# Patient Record
Sex: FEMALE | URBAN - METROPOLITAN AREA
[De-identification: names, ages, dates, MRNs, and addresses within clinical notes are randomized per-mention and may not be internally consistent; named-entity substitution may affect disease eponyms.]

---

## 2022-04-25 ENCOUNTER — WEB ENCOUNTER (OUTPATIENT)
Dept: URBAN - METROPOLITAN AREA CLINIC 113 | Facility: CLINIC | Age: 47
End: 2022-04-25

## 2022-04-25 ENCOUNTER — CLAIMS CREATED FROM THE CLAIM WINDOW (OUTPATIENT)
Dept: URBAN - METROPOLITAN AREA CLINIC 113 | Facility: CLINIC | Age: 47
End: 2022-04-25
Payer: COMMERCIAL

## 2022-04-25 VITALS
DIASTOLIC BLOOD PRESSURE: 76 MMHG | TEMPERATURE: 97.5 F | SYSTOLIC BLOOD PRESSURE: 114 MMHG | RESPIRATION RATE: 20 BRPM | HEART RATE: 78 BPM | HEIGHT: 62 IN | BODY MASS INDEX: 26.68 KG/M2 | WEIGHT: 145 LBS

## 2022-04-25 DIAGNOSIS — R76.8 ANTIMITOCHONDRIAL ANTIBODY POSITIVE: ICD-10-CM

## 2022-04-25 PROCEDURE — 99244 OFF/OP CNSLTJ NEW/EST MOD 40: CPT | Performed by: NURSE PRACTITIONER

## 2022-04-25 PROCEDURE — 99204 OFFICE O/P NEW MOD 45 MIN: CPT | Performed by: INTERNAL MEDICINE

## 2022-04-25 PROCEDURE — 99244 OFF/OP CNSLTJ NEW/EST MOD 40: CPT | Performed by: INTERNAL MEDICINE

## 2022-04-25 RX ORDER — SALICYLIC ACID 3 G/100G
1 TABLET SWALLOW WHOLE WITH WATER; DO NOT TAKE WITH FRUIT JUICES LOTION TOPICAL ONCE A DAY
Status: ACTIVE | COMMUNITY

## 2022-04-25 RX ORDER — IXEKIZUMAB 80 MG/ML
1 ML INJECTION, SOLUTION SUBCUTANEOUS
Status: ACTIVE | COMMUNITY

## 2022-04-25 RX ORDER — MELATONIN 5 MG
1 TABLET IN THE EVENING TABLET ORAL ONCE A DAY
Status: ACTIVE | COMMUNITY

## 2022-04-25 RX ORDER — SERTRALINE 50 MG/1
1 TABLET TABLET, FILM COATED ORAL ONCE A DAY
Status: ACTIVE | COMMUNITY

## 2022-04-25 RX ORDER — AZELASTINE HYDROCHLORIDE 137 UG/1
1 PUFF IN EACH NOSTRIL SPRAY, METERED NASAL TWICE A DAY
Status: ACTIVE | COMMUNITY

## 2022-04-25 RX ORDER — BUSPIRONE HYDROCHLORIDE 10 MG/1
1 TABLET TABLET ORAL TWICE A DAY
Status: ACTIVE | COMMUNITY

## 2022-04-25 NOTE — HPI-TODAY'S VISIT:
47yo female with a history of psoriatic arthritis referred by Karen Penrose, NP for evaluation of possible PBC.   Within the last 6 months, she has experienced increasing flares of psoriatic symptoms despite [facility], prompting bloodwork with dermatology as outlined below. She is without abdominal complaint. No abdominal pain, nausea or vomiting. Her bowel habits are regular and she denies blood per rectum. No jaundice or icterus. She has not had any recent imaging of her abdomen, but states a prior MRI of her breast did incidentally note a small liver cyst. She had a normal colonoscopy in 2020 with Dr. Anne at [place] and underwent Botox of an anal fissure at that time. She was told to repeat her colonoscopy every 5 years as both parents have a history of colon polyps.  Labs  3/18/22: Reactive hepatitis A antibody total, hepatitis B surface antibody. Negative hepatitis B surface antigen, hepatitis B core antibody total, hepatitis C antibody. Positive AMA, titer greater than 1:640. 2/28/22: H/H 12.1/37, MCV 90, Plt 350, WBC 10.2. CMP unremarkable with normal LFTs, AST 24, ALT 16, ALP 56, Tbili 0.7. CRP 5.5. Normal TSH. Positive BRIE, titer 1:1280 with cytoloplasmic, reticular/AMA pattern.

## 2022-05-01 LAB
A/G RATIO: 1.4
ACTIN (SMOOTH MUSCLE) ANTIBODY: 13
ALBUMIN: 4.5
ALKALINE PHOSPHATASE: 98
ALT (SGPT): 19
AST (SGOT): 26
BASO (ABSOLUTE): 0.2
BASOS: 2
BILIRUBIN, TOTAL: 0.3
BUN/CREATININE RATIO: 16
BUN: 15
CALCIUM: 9.3
CARBON DIOXIDE, TOTAL: 21
CHLORIDE: 100
CREATININE: 0.94
EGFR: 75
EOS (ABSOLUTE): 0.6
EOS: 7
GLOBULIN, TOTAL: 3.3
GLUCOSE: 86
HEMATOCRIT: 38.7
HEMATOLOGY COMMENTS:: (no result)
HEMOGLOBIN: 12.4
IMMATURE CELLS: (no result)
IMMATURE GRANS (ABS): 0
IMMATURE GRANULOCYTES: 0
IMMUNOGLOBULIN A, QN, SERUM: 234
IMMUNOGLOBULIN E, TOTAL: <2
IMMUNOGLOBULIN G, QN, SERUM: 1218
IMMUNOGLOBULIN M, QN, SERUM: 424
LYMPHS (ABSOLUTE): 2.4
LYMPHS: 26
MCH: 29.7
MCHC: 32
MCV: 93
MITOCHONDRIAL (M2) ANTIBODY: 252.7
MONOCYTES(ABSOLUTE): 0.9
MONOCYTES: 11
NEUTROPHILS (ABSOLUTE): 4.9
NEUTROPHILS: 54
NRBC: (no result)
PLATELETS: 453
POTASSIUM: 4.9
PROTEIN, TOTAL: 7.8
RBC: 4.17
RDW: 13.1
SODIUM: 139
WBC: 8.9

## 2022-05-05 ENCOUNTER — TELEPHONE ENCOUNTER (OUTPATIENT)
Dept: URBAN - METROPOLITAN AREA CLINIC 113 | Facility: CLINIC | Age: 47
End: 2022-05-05

## 2022-05-16 ENCOUNTER — TELEPHONE ENCOUNTER (OUTPATIENT)
Dept: URBAN - METROPOLITAN AREA CLINIC 113 | Facility: CLINIC | Age: 47
End: 2022-05-16

## 2022-07-08 ENCOUNTER — OFFICE VISIT (OUTPATIENT)
Dept: URBAN - METROPOLITAN AREA CLINIC 113 | Facility: CLINIC | Age: 47
End: 2022-07-08
Payer: COMMERCIAL

## 2022-07-08 ENCOUNTER — WEB ENCOUNTER (OUTPATIENT)
Dept: URBAN - METROPOLITAN AREA CLINIC 113 | Facility: CLINIC | Age: 47
End: 2022-07-08

## 2022-07-08 VITALS
BODY MASS INDEX: 26.5 KG/M2 | RESPIRATION RATE: 16 BRPM | HEIGHT: 62 IN | HEART RATE: 71 BPM | WEIGHT: 144 LBS | TEMPERATURE: 98.6 F

## 2022-07-08 DIAGNOSIS — R76.8 ANTIMITOCHONDRIAL ANTIBODY POSITIVE: ICD-10-CM

## 2022-07-08 PROCEDURE — 99214 OFFICE O/P EST MOD 30 MIN: CPT | Performed by: INTERNAL MEDICINE

## 2022-07-08 RX ORDER — SERTRALINE 50 MG/1
1 TABLET TABLET, FILM COATED ORAL ONCE A DAY
Status: ACTIVE | COMMUNITY

## 2022-07-08 RX ORDER — BUSPIRONE HYDROCHLORIDE 10 MG/1
1 TABLET TABLET ORAL TWICE A DAY
Status: ACTIVE | COMMUNITY

## 2022-07-08 RX ORDER — AZELASTINE HYDROCHLORIDE 137 UG/1
1 PUFF IN EACH NOSTRIL SPRAY, METERED NASAL TWICE A DAY
Status: ACTIVE | COMMUNITY

## 2022-07-08 RX ORDER — IXEKIZUMAB 80 MG/ML
1 ML INJECTION, SOLUTION SUBCUTANEOUS
Status: ACTIVE | COMMUNITY

## 2022-07-08 RX ORDER — SALICYLIC ACID 3 G/100G
1 TABLET SWALLOW WHOLE WITH WATER; DO NOT TAKE WITH FRUIT JUICES LOTION TOPICAL ONCE A DAY
Status: ACTIVE | COMMUNITY

## 2022-07-08 RX ORDER — MELATONIN 5 MG
1 TABLET IN THE EVENING TABLET ORAL ONCE A DAY
Status: ACTIVE | COMMUNITY

## 2022-07-08 NOTE — HPI-OTHER HISTORIES
Labs  3/18/22: Reactive hepatitis A antibody total, hepatitis B surface antibody. Negative hepatitis B surface antigen, hepatitis B core antibody total, hepatitis C antibody. Positive AMA, titer greater than 1:640. 2/28/22: H/H 12.1/37, MCV 90, Plt 350, WBC 10.2. CMP unremarkable with normal LFTs, AST 24, ALT 16, ALP 56, Tbili 0.7. CRP 5.5. Normal TSH. Positive BRIE, titer 1:1280 with cytoloplasmic, reticular/AMA pattern. She had a normal colonoscopy in 2020 with Dr. Anne at [place] and underwent Botox of an anal fissure at that time. She was told to repeat her colonoscopy every 5 years as both parents have a history of colon polyps.

## 2022-07-08 NOTE — HPI-TODAY'S VISIT:
47yo female with a history of psoriatic arthritis presenting for follow-up of a positive AMA. She was last seen in the office 4/25/2022 for evaluation of a positive antimitochondrial antibody, incidentally noted during routine labs performed for follow-up of her psoriatic arthritis.  Additional labs were planned to exclude primary biliary cholangitis, and an abdominal ultrasound was recommended to evaluate hepatic parenchyma. Abdominal ultrasound 4/29/2022 was normal. Labs 4/25/2022:Markedly elevated AMA at 252.7, negative anti-smooth muscle antibody.  Elevated serum immunoglobulin M, normal IgG.  H/H12.4/38.7, MCV 93, , WBC 8.9.  CMP unremarkable with normal LFTs, AST 26, ALT 19, ALP 98, T bili 0.3. She continues to do well from a GI standpoint. No abdominal pain, nausea or vomiting. Her bowel habits are regular and she denies blood per rectum. She has pending labs with her dermatologist, to include a CMP. She may be transitioning to Cimzia for treatment of psoriatic arthritis in the near future.

## 2022-08-23 ENCOUNTER — TELEPHONE ENCOUNTER (OUTPATIENT)
Dept: URBAN - METROPOLITAN AREA CLINIC 113 | Facility: CLINIC | Age: 47
End: 2022-08-23

## 2022-11-11 ENCOUNTER — CLAIMS CREATED FROM THE CLAIM WINDOW (OUTPATIENT)
Dept: URBAN - METROPOLITAN AREA CLINIC 113 | Facility: CLINIC | Age: 47
End: 2022-11-11
Payer: COMMERCIAL

## 2022-11-11 VITALS
BODY MASS INDEX: 25.58 KG/M2 | HEART RATE: 89 BPM | HEIGHT: 62 IN | WEIGHT: 139 LBS | SYSTOLIC BLOOD PRESSURE: 113 MMHG | RESPIRATION RATE: 16 BRPM | DIASTOLIC BLOOD PRESSURE: 71 MMHG | TEMPERATURE: 96.1 F

## 2022-11-11 DIAGNOSIS — R76.8 ANTIMITOCHONDRIAL ANTIBODY POSITIVE: ICD-10-CM

## 2022-11-11 PROCEDURE — 99213 OFFICE O/P EST LOW 20 MIN: CPT | Performed by: INTERNAL MEDICINE

## 2022-11-11 RX ORDER — SALICYLIC ACID 3 G/100G
1 TABLET SWALLOW WHOLE WITH WATER; DO NOT TAKE WITH FRUIT JUICES LOTION TOPICAL ONCE A DAY
Status: ACTIVE | COMMUNITY

## 2022-11-11 RX ORDER — IXEKIZUMAB 80 MG/ML
1 ML INJECTION, SOLUTION SUBCUTANEOUS
Status: ON HOLD | COMMUNITY

## 2022-11-11 RX ORDER — AZELASTINE HYDROCHLORIDE 137 UG/1
1 PUFF IN EACH NOSTRIL SPRAY, METERED NASAL TWICE A DAY
Status: ACTIVE | COMMUNITY

## 2022-11-11 RX ORDER — CERTOLIZUMAB PEGOL 400 MG
AS DIRECTED KIT SUBCUTANEOUS
Status: ACTIVE | COMMUNITY

## 2022-11-11 RX ORDER — SERTRALINE 50 MG/1
1 TABLET TABLET, FILM COATED ORAL ONCE A DAY
Status: ACTIVE | COMMUNITY

## 2022-11-11 RX ORDER — MELATONIN 5 MG
1 TABLET IN THE EVENING TABLET ORAL ONCE A DAY
Status: ACTIVE | COMMUNITY

## 2022-11-11 RX ORDER — BUSPIRONE HYDROCHLORIDE 10 MG/1
1 TABLET TABLET ORAL TWICE A DAY
Status: ACTIVE | COMMUNITY

## 2022-11-11 NOTE — HPI-OTHER HISTORIES
Labs  3/18/22: Reactive hepatitis A antibody total, hepatitis B surface antibody. Negative hepatitis B surface antigen, hepatitis B core antibody total, hepatitis C antibody. Positive AMA, titer greater than 1:640. 2/28/22: H/H 12.1/37, MCV 90, Plt 350, WBC 10.2. CMP unremarkable with normal LFTs, AST 24, ALT 16, ALP 56, Tbili 0.7. CRP 5.5. Normal TSH. Positive BRIE, titer 1:1280 with cytoloplasmic, reticular/AMA pattern. She had a normal colonoscopy in 2020 with Dr. Anne at [place] and underwent Botox of an anal fissure at that time. She was told to repeat her colonoscopy every 5 years as both parents have a history of colon polyps.

## 2022-11-11 NOTE — HPI-TODAY'S VISIT:
46 yo female with a history of psoriatic arthritis presenting for follow-up of a positive AMA. She was seen in the office 4/25/2022 for evaluation of a positive antimitochondrial antibody, incidentally noted during routine labs performed for follow-up of her psoriatic arthritis.  Abdominal ultrasound 4/29/2022 was normal.  Labs 4/25/2022:Markedly elevated AMA at 252.7, negative anti-smooth muscle antibody.  Elevated serum immunoglobulin M, normal IgG.  H/H12.4/38.7, MCV 93, , WBC 8.9.  CMP unremarkable with normal LFTs, AST 26, ALT 19, ALP 98, T bili 0.3.  She is doing well and has had no heartburn, dysphagia, abdominal pain, fevers or chills, nausea or vomiting.  No skin rashes.  She does have a bowel movement everyday this been no blood or melena.  She does not smoke cigarettes.  She is on Cimzia for treatment of psoriatic arthritis. She did test positive for C. difficile and was treated with Flagyl about a week after her last visit.  Currently no diarrhea.  Blood work on 8/3/2022 revealed a hemoglobin of 12.5 WBC of 7.6 and platelet count 461,000.  Sodium 133 potassium 3.9 BUN 14 creatinine 1.02.  AST 21, ALT 14, alk phosphatase 50, total bili 0.9.  Hepatitis A total antibody positive hepatitis B surface antibody positive, albumin was 4.5.

## 2022-11-16 ENCOUNTER — TELEPHONE ENCOUNTER (OUTPATIENT)
Dept: URBAN - METROPOLITAN AREA CLINIC 113 | Facility: CLINIC | Age: 47
End: 2022-11-16

## 2022-11-16 LAB
CHOL/HDLC RATIO: 2.3
CHOLESTEROL, TOTAL: 202
GGT: 13
HDL CHOLESTEROL: 88
INR: 0.9
LDL CHOLESTEROL CALC: 93
NON HDL CHOLESTEROL: 114
PT: 9.4
TRIGLYCERIDES: 115
VITAMIN A: 59
VITAMIN D,25-OH,TOTAL,IA: 37

## 2022-12-07 ENCOUNTER — TELEPHONE ENCOUNTER (OUTPATIENT)
Dept: URBAN - METROPOLITAN AREA CLINIC 113 | Facility: CLINIC | Age: 47
End: 2022-12-07

## 2022-12-09 ENCOUNTER — TELEPHONE ENCOUNTER (OUTPATIENT)
Dept: URBAN - METROPOLITAN AREA CLINIC 113 | Facility: CLINIC | Age: 47
End: 2022-12-09

## 2022-12-12 PROBLEM — 31712002: Status: ACTIVE | Noted: 2022-12-12

## 2023-03-08 ENCOUNTER — TELEPHONE ENCOUNTER (OUTPATIENT)
Dept: URBAN - METROPOLITAN AREA CLINIC 113 | Facility: CLINIC | Age: 48
End: 2023-03-08

## 2023-09-20 ENCOUNTER — TELEPHONE ENCOUNTER (OUTPATIENT)
Dept: URBAN - METROPOLITAN AREA CLINIC 113 | Facility: CLINIC | Age: 48
End: 2023-09-20

## 2023-11-10 ENCOUNTER — OFFICE VISIT (OUTPATIENT)
Dept: URBAN - METROPOLITAN AREA CLINIC 113 | Facility: CLINIC | Age: 48
End: 2023-11-10

## 2024-01-12 ENCOUNTER — OFFICE VISIT (OUTPATIENT)
Dept: URBAN - METROPOLITAN AREA CLINIC 113 | Facility: CLINIC | Age: 49
End: 2024-01-12
Payer: COMMERCIAL

## 2024-01-12 ENCOUNTER — DASHBOARD ENCOUNTERS (OUTPATIENT)
Age: 49
End: 2024-01-12

## 2024-01-12 VITALS
RESPIRATION RATE: 18 BRPM | HEIGHT: 62 IN | TEMPERATURE: 97.3 F | BODY MASS INDEX: 28.56 KG/M2 | WEIGHT: 155.2 LBS | SYSTOLIC BLOOD PRESSURE: 112 MMHG | HEART RATE: 78 BPM | DIASTOLIC BLOOD PRESSURE: 72 MMHG

## 2024-01-12 DIAGNOSIS — Z83.719 FAMILY HISTORY OF COLONIC POLYPS: ICD-10-CM

## 2024-01-12 DIAGNOSIS — R76.8 ANTIMITOCHONDRIAL ANTIBODY POSITIVE: ICD-10-CM

## 2024-01-12 PROBLEM — 429969003: Status: ACTIVE | Noted: 2024-01-12

## 2024-01-12 PROCEDURE — 99212 OFFICE O/P EST SF 10 MIN: CPT | Performed by: INTERNAL MEDICINE

## 2024-01-12 RX ORDER — SERTRALINE 50 MG/1
1 TABLET TABLET, FILM COATED ORAL ONCE A DAY
Status: ACTIVE | COMMUNITY

## 2024-01-12 RX ORDER — IXEKIZUMAB 80 MG/ML
1 ML INJECTION, SOLUTION SUBCUTANEOUS
Status: ON HOLD | COMMUNITY

## 2024-01-12 RX ORDER — SALICYLIC ACID 3 G/100G
1 TABLET SWALLOW WHOLE WITH WATER; DO NOT TAKE WITH FRUIT JUICES LOTION TOPICAL ONCE A DAY
Status: ACTIVE | COMMUNITY

## 2024-01-12 RX ORDER — CERTOLIZUMAB PEGOL 400 MG
AS DIRECTED KIT SUBCUTANEOUS
Status: ACTIVE | COMMUNITY

## 2024-01-12 RX ORDER — AZELASTINE HYDROCHLORIDE 137 UG/1
1 PUFF IN EACH NOSTRIL SPRAY, METERED NASAL TWICE A DAY
Status: ACTIVE | COMMUNITY

## 2024-01-12 RX ORDER — BUSPIRONE HYDROCHLORIDE 10 MG/1
1 TABLET TABLET ORAL TWICE A DAY
Status: ACTIVE | COMMUNITY

## 2024-01-12 RX ORDER — MELATONIN 5 MG
1 TABLET IN THE EVENING TABLET ORAL ONCE A DAY
Status: ACTIVE | COMMUNITY

## 2024-01-12 NOTE — HPI-TODAY'S VISIT:
47 yo female with a history of psoriatic arthritis presenting for follow-up of a positive AMA. She was seen in the office 4/25/2022 for evaluation of a positive antimitochondrial antibody, incidentally noted during routine labs performed for follow-up of her psoriatic arthritis.  Abdominal ultrasound 4/29/2022 was normal.  Labs 4/25/2022:Markedly elevated AMA at 252.7, negative anti-smooth muscle antibody.  Elevated serum immunoglobulin M, normal IgG.  H/H12.4/38.7, MCV 93, , WBC 8.9.  CMP unremarkable with normal LFTs, AST 26, ALT 19, ALP 98, T bili 0.3.  She is doing well and has had no new medical diagnoses or new surgeries.  She sees her dermatologist in February and she will be getting repeat blood work then.  She has had no abdominal pain, heartburn or dysphagia.  There is no nausea or vomiting.  She has gained weight.  She has 2 bowel moods per day that been unchanged has been no blood or melena.  She did get her colonoscopies by Dr. Anne the last being in October 2020 and has been recommended repeat colonoscopy in 5 years.  She would like to do that here.  Blood work on 9/27/2023 revealed a hemoglobin of 12.5, WBC of 6.4 and platelet count 362,000.  Sodium 138 potassium 4.4 BUN 20 creatinine 0.84.  Total bili 0.7, AST 21, ALT 16, alk phosphatase 54. Blood work on 8/3/2022 Hepatitis A total antibody positive hepatitis B surface antibody positive, albumin was 4.5.

## 2024-01-12 NOTE — HPI-OTHER HISTORIES
Labs  3/18/22: Reactive hepatitis A antibody total, hepatitis B surface antibody. Negative hepatitis B surface antigen, hepatitis B core antibody total, hepatitis C antibody. Positive AMA, titer greater than 1:640. 2/28/22: H/H 12.1/37, MCV 90, Plt 350, WBC 10.2. CMP unremarkable with normal LFTs, AST 24, ALT 16, ALP 56, Tbili 0.7. CRP 5.5. Normal TSH. Positive BRIE, titer 1:1280 with cytoloplasmic, reticular/AMA pattern. She had a normal colonoscopy in 2020 with Dr. Anne at [place] and underwent Botox of an anal fissure at that time. She was told to repeat her colonoscopy every 5 years as both parents have a history of colon polyps.

## 2024-01-22 ENCOUNTER — WEB ENCOUNTER (OUTPATIENT)
Dept: URBAN - METROPOLITAN AREA CLINIC 113 | Facility: CLINIC | Age: 49
End: 2024-01-22

## 2025-03-24 ENCOUNTER — OFFICE VISIT (OUTPATIENT)
Dept: URBAN - METROPOLITAN AREA CLINIC 113 | Facility: CLINIC | Age: 50
End: 2025-03-24

## 2025-03-26 ENCOUNTER — OFFICE VISIT (OUTPATIENT)
Dept: URBAN - METROPOLITAN AREA CLINIC 107 | Facility: CLINIC | Age: 50
End: 2025-03-26
Payer: COMMERCIAL

## 2025-03-26 DIAGNOSIS — R76.8 ANTIMITOCHONDRIAL ANTIBODY POSITIVE: ICD-10-CM

## 2025-03-26 DIAGNOSIS — Z83.719 FAMILY HISTORY OF COLONIC POLYPS: ICD-10-CM

## 2025-03-26 DIAGNOSIS — K60.2 ANAL FISSURE: ICD-10-CM

## 2025-03-26 DIAGNOSIS — K74.3 PRIMARY BILIARY CHOLANGITIS: ICD-10-CM

## 2025-03-26 PROBLEM — 310294002: Status: ACTIVE | Noted: 2025-03-26

## 2025-03-26 PROBLEM — 30037006: Status: ACTIVE | Noted: 2025-03-26

## 2025-03-26 PROCEDURE — 99213 OFFICE O/P EST LOW 20 MIN: CPT | Performed by: INTERNAL MEDICINE

## 2025-03-26 RX ORDER — MELATONIN 5 MG
1 TABLET IN THE EVENING TABLET ORAL ONCE A DAY
Status: ACTIVE | COMMUNITY

## 2025-03-26 RX ORDER — CERTOLIZUMAB PEGOL 400 MG
AS DIRECTED KIT SUBCUTANEOUS
Status: DISCONTINUED | COMMUNITY

## 2025-03-26 RX ORDER — SALICYLIC ACID 3 G/100G
1 TABLET SWALLOW WHOLE WITH WATER; DO NOT TAKE WITH FRUIT JUICES LOTION TOPICAL ONCE A DAY
Status: ACTIVE | COMMUNITY

## 2025-03-26 RX ORDER — AZELASTINE HYDROCHLORIDE 137 UG/1
1 PUFF IN EACH NOSTRIL SPRAY, METERED NASAL TWICE A DAY
Status: ACTIVE | COMMUNITY

## 2025-03-26 RX ORDER — BUSPIRONE HYDROCHLORIDE 10 MG/1
1 TABLET TABLET ORAL TWICE A DAY
Status: ACTIVE | COMMUNITY

## 2025-03-26 RX ORDER — SERTRALINE 50 MG/1
1 TABLET TABLET, FILM COATED ORAL ONCE A DAY
Status: ACTIVE | COMMUNITY

## 2025-03-26 RX ORDER — IXEKIZUMAB 80 MG/ML
1 ML INJECTION, SOLUTION SUBCUTANEOUS
Status: DISCONTINUED | COMMUNITY

## 2025-03-26 NOTE — HPI-TODAY'S VISIT:
48 yo female with a history of psoriatic arthritis presenting for follow-up of a positive AMA.  She was seen in the office 4/25/2022 for evaluation of a positive antimitochondrial antibody, incidentally noted during routine labs performed for follow-up of her psoriatic arthritis.  Abdominal ultrasound 4/29/2022 was normal.  Labs 4/25/2022:Markedly elevated AMA at 252.7, negative anti-smooth muscle antibody.  Elevated serum immunoglobulin M, normal IgG.  H/H12.4/38.7, MCV 93, , WBC 8.9.  CMP unremarkable with normal LFTs, AST 26, ALT 19, ALP 98, T bili 0.3.  Overall she is doing well.  There is been no heartburn, abdominal pain, dysphagia.  There is no nausea or vomiting.  She does have 1-2 bowel movements per day there is been no melena.  Recently she is started having an anal fissure again with bleeding and some pain with bowel movements. She did get her colonoscopies by Dr. Anne the last being in October 2020 and has been recommended repeat colonoscopy in 5 years.  She would like to do that here.  She has started on Cosentyx for her psoriatic arthritis.  Blood work on 11/6/2024 revealed a sodium 139 potassium 4.4 BUN 15 creatinine 0.79.  AST 23, ALT 15, alk phosphatase 79, total bili less than 0.2, albumin 4.0.  Hepatitis B surface antigen negative, hepatitis B core antibody total negative, hepatitis B surface antibody positive.  Hepatitis C antibody negative.  Hepatitis A total antibody positive.  QuantiFERON gold TB test negative.

## 2025-03-26 NOTE — HPI-OTHER HISTORIES
Labs  3/18/22: Reactive hepatitis A antibody total, hepatitis B surface antibody. Negative hepatitis B surface antigen, hepatitis B core antibody total, hepatitis C antibody. Positive AMA, titer greater than 1:640. 2/28/22: H/H 12.1/37, MCV 90, Plt 350, WBC 10.2. CMP unremarkable with normal LFTs, AST 24, ALT 16, ALP 56, Tbili 0.7. CRP 5.5. Normal TSH. Positive BRIE, titer 1:1280 with cytoloplasmic, reticular/AMA pattern. She had a normal colonoscopy in 2020 with Dr. Anne at [place] and underwent Botox of an anal fissure at that time. She was told to repeat her colonoscopy every 5 years as both parents have a history of colon polyps.

## 2025-05-05 ENCOUNTER — LAB OUTSIDE AN ENCOUNTER (OUTPATIENT)
Dept: URBAN - METROPOLITAN AREA CLINIC 107 | Facility: CLINIC | Age: 50
End: 2025-05-05

## 2025-05-05 ENCOUNTER — OFFICE VISIT (OUTPATIENT)
Dept: URBAN - METROPOLITAN AREA CLINIC 107 | Facility: CLINIC | Age: 50
End: 2025-05-05
Payer: COMMERCIAL

## 2025-05-05 DIAGNOSIS — K60.2 ANAL FISSURE: ICD-10-CM

## 2025-05-05 DIAGNOSIS — K74.3 PRIMARY BILIARY CHOLANGITIS: ICD-10-CM

## 2025-05-05 DIAGNOSIS — Z83.719 FAMILY HISTORY OF COLONIC POLYPS: ICD-10-CM

## 2025-05-05 DIAGNOSIS — R76.8 ANTIMITOCHONDRIAL ANTIBODY POSITIVE: ICD-10-CM

## 2025-05-05 PROCEDURE — 99214 OFFICE O/P EST MOD 30 MIN: CPT | Performed by: INTERNAL MEDICINE

## 2025-05-05 RX ORDER — SALICYLIC ACID 3 G/100G
1 TABLET SWALLOW WHOLE WITH WATER; DO NOT TAKE WITH FRUIT JUICES LOTION TOPICAL ONCE A DAY
Status: ACTIVE | COMMUNITY

## 2025-05-05 RX ORDER — BUSPIRONE HYDROCHLORIDE 10 MG/1
1 TABLET TABLET ORAL TWICE A DAY
Status: ACTIVE | COMMUNITY

## 2025-05-05 RX ORDER — SERTRALINE 50 MG/1
1 TABLET TABLET, FILM COATED ORAL ONCE A DAY
Status: ACTIVE | COMMUNITY

## 2025-05-05 RX ORDER — MELATONIN 5 MG
1 TABLET IN THE EVENING TABLET ORAL ONCE A DAY
Status: ACTIVE | COMMUNITY

## 2025-05-05 RX ORDER — AZELASTINE HYDROCHLORIDE 137 UG/1
1 PUFF IN EACH NOSTRIL SPRAY, METERED NASAL TWICE A DAY
Status: ACTIVE | COMMUNITY

## 2025-05-05 NOTE — HPI-TODAY'S VISIT:
48 yo female with a history of psoriatic arthritis presenting for follow-up of a positive AMA.  She was seen in the office 4/25/2022 for evaluation of a positive antimitochondrial antibody, incidentally noted during routine labs performed for follow-up of her psoriatic arthritis.  Abdominal ultrasound 4/29/2022 was normal.  Labs 4/25/2022:Markedly elevated AMA at 252.7, negative anti-smooth muscle antibody.  Elevated serum immunoglobulin M, normal IgG.  H/H12.4/38.7, MCV 93, , WBC 8.9.  CMP unremarkable with normal LFTs, AST 26, ALT 19, ALP 98, T bili 0.3.  Despite treatment for her anal fissure it has not healed completely.  She still occasionally has some bleeding.  Overall it is better but not completely healed.  She typically moves her bowels every day there is been no melena.  There is no nausea or vomiting, abdominal pain.  She denies any heartburn or dysphagia.  She did get her colonoscopies by Dr. Anne the last being in October 2020 and has been recommended repeat colonoscopy in 5 years.  She would like to do that here.  She has started on Cosentyx for her psoriatic arthritis.  Blood work on 4/3/2025 revealed a hemoglobin of 12.2, WBC of 7.1 and platelet count 393,000.  MCV is 91.  Sodium 133 potassium 4.6 BUN 14 creatinine 0.78.  AST 28, ALT 22, alk phosphatase 71, total bili 0.5, albumin 3.6.  Blood work on 11/6/2024 revealed a sodium 139 potassium 4.4 BUN 15 creatinine 0.79.  AST 23, ALT 15, alk phosphatase 79, total bili less than 0.2, albumin 4.0.  Hepatitis B surface antigen negative, hepatitis B core antibody total negative, hepatitis B surface antibody positive.  Hepatitis C antibody negative.  Hepatitis A total antibody positive.  QuantiFERON gold TB test negative.

## 2025-05-05 NOTE — HPI-OTHER HISTORIES
Labs  3/18/22: Reactive hepatitis A antibody total, hepatitis B surface antibody. Negative hepatitis B surface antigen, hepatitis B core antibody total, hepatitis C antibody. Positive AMA, titer greater than 1:640. 2/28/22: H/H 12.1/37, MCV 90, Plt 350, WBC 10.2. CMP unremarkable with normal LFTs, AST 24, ALT 16, ALP 56, Tbili 0.7. CRP 5.5. Normal TSH. Positive BRIE, titer 1:1280 with cytoloplasmic, reticular/AMA pattern. She had a normal colonoscopy in 2020 with Dr. Anne at [place] and underwent Botox of an anal fissure at that time. She was told to repeat her colonoscopy every 5 years as both parents have a history of colon polyps.

## 2025-05-09 ENCOUNTER — OFFICE VISIT (OUTPATIENT)
Dept: URBAN - METROPOLITAN AREA CLINIC 112 | Facility: CLINIC | Age: 50
End: 2025-05-09

## 2025-05-23 ENCOUNTER — OFFICE VISIT (OUTPATIENT)
Dept: URBAN - METROPOLITAN AREA CLINIC 112 | Facility: CLINIC | Age: 50
End: 2025-05-23

## 2025-06-20 ENCOUNTER — OFFICE VISIT (OUTPATIENT)
Dept: URBAN - METROPOLITAN AREA CLINIC 112 | Facility: CLINIC | Age: 50
End: 2025-06-20
Payer: COMMERCIAL

## 2025-06-20 DIAGNOSIS — K74.3 PRIMARY BILIARY CHOLANGITIS: ICD-10-CM

## 2025-06-20 PROCEDURE — 76981 USE PARENCHYMA: CPT | Performed by: INTERNAL MEDICINE

## 2025-06-20 RX ORDER — BUSPIRONE HYDROCHLORIDE 10 MG/1
1 TABLET TABLET ORAL TWICE A DAY
Status: ACTIVE | COMMUNITY

## 2025-06-20 RX ORDER — MELATONIN 5 MG
1 TABLET IN THE EVENING TABLET ORAL ONCE A DAY
Status: ACTIVE | COMMUNITY

## 2025-06-20 RX ORDER — AZELASTINE HYDROCHLORIDE 137 UG/1
1 PUFF IN EACH NOSTRIL SPRAY, METERED NASAL TWICE A DAY
Status: ACTIVE | COMMUNITY

## 2025-06-20 RX ORDER — SERTRALINE 50 MG/1
1 TABLET TABLET, FILM COATED ORAL ONCE A DAY
Status: ACTIVE | COMMUNITY

## 2025-06-20 RX ORDER — SALICYLIC ACID 3 G/100G
1 TABLET SWALLOW WHOLE WITH WATER; DO NOT TAKE WITH FRUIT JUICES LOTION TOPICAL ONCE A DAY
Status: ACTIVE | COMMUNITY

## 2025-06-27 ENCOUNTER — CLAIMS CREATED FROM THE CLAIM WINDOW (OUTPATIENT)
Dept: URBAN - METROPOLITAN AREA CLINIC 117 | Facility: CLINIC | Age: 50
End: 2025-06-27
Payer: COMMERCIAL

## 2025-06-27 DIAGNOSIS — K74.3 PRIMARY BILIARY CHOLANGITIS: ICD-10-CM

## 2025-06-27 PROCEDURE — 76981 USE PARENCHYMA: CPT | Performed by: INTERNAL MEDICINE

## 2025-07-16 ENCOUNTER — TELEPHONE ENCOUNTER (OUTPATIENT)
Dept: URBAN - METROPOLITAN AREA CLINIC 107 | Facility: CLINIC | Age: 50
End: 2025-07-16

## 2025-08-26 ENCOUNTER — WEB ENCOUNTER (OUTPATIENT)
Dept: URBAN - METROPOLITAN AREA CLINIC 113 | Facility: CLINIC | Age: 50
End: 2025-08-26